# Patient Record
Sex: MALE | Race: WHITE
[De-identification: names, ages, dates, MRNs, and addresses within clinical notes are randomized per-mention and may not be internally consistent; named-entity substitution may affect disease eponyms.]

---

## 2018-04-27 ENCOUNTER — HOSPITAL ENCOUNTER (OUTPATIENT)
Dept: HOSPITAL 76 - EMS | Age: 58
Discharge: ANOTHER HOSPITAL | End: 2018-04-27
Attending: SURGERY
Payer: MEDICAID

## 2018-04-27 DIAGNOSIS — M54.9: ICD-10-CM

## 2018-04-27 DIAGNOSIS — R53.1: Primary | ICD-10-CM

## 2019-09-04 ENCOUNTER — HOSPITAL ENCOUNTER (OUTPATIENT)
Dept: HOSPITAL 76 - EMS | Age: 59
Discharge: ANOTHER HOSPITAL | End: 2019-09-04
Attending: SURGERY
Payer: MEDICAID

## 2019-09-04 DIAGNOSIS — R11.2: ICD-10-CM

## 2019-09-04 DIAGNOSIS — M54.5: Primary | ICD-10-CM

## 2020-11-19 ENCOUNTER — HOSPITAL ENCOUNTER (OUTPATIENT)
Dept: HOSPITAL 76 - CAM | Age: 60
Discharge: HOME | End: 2020-11-19
Attending: FAMILY MEDICINE
Payer: MEDICAID

## 2020-11-19 DIAGNOSIS — M47.22: Primary | ICD-10-CM

## 2020-11-19 DIAGNOSIS — M47.817: ICD-10-CM

## 2020-11-19 PROCEDURE — 97810 ACUP 1/> WO ESTIM 1ST 15 MIN: CPT

## 2020-11-19 PROCEDURE — 97811 ACUP 1/> W/O ESTIM EA ADD 15: CPT

## 2020-12-03 ENCOUNTER — HOSPITAL ENCOUNTER (OUTPATIENT)
Dept: HOSPITAL 76 - CAM | Age: 60
Discharge: HOME | End: 2020-12-03
Attending: FAMILY MEDICINE
Payer: MEDICAID

## 2020-12-03 DIAGNOSIS — M47.817: ICD-10-CM

## 2020-12-03 DIAGNOSIS — M47.812: Primary | ICD-10-CM

## 2020-12-03 DIAGNOSIS — M54.12: ICD-10-CM

## 2020-12-03 PROCEDURE — 97811 ACUP 1/> W/O ESTIM EA ADD 15: CPT

## 2020-12-03 PROCEDURE — 97810 ACUP 1/> WO ESTIM 1ST 15 MIN: CPT

## 2020-12-31 ENCOUNTER — HOSPITAL ENCOUNTER (OUTPATIENT)
Dept: HOSPITAL 76 - CAM | Age: 60
Discharge: HOME | End: 2020-12-31
Attending: FAMILY MEDICINE
Payer: MEDICAID

## 2020-12-31 DIAGNOSIS — M47.817: ICD-10-CM

## 2020-12-31 DIAGNOSIS — M54.12: ICD-10-CM

## 2020-12-31 DIAGNOSIS — M47.812: Primary | ICD-10-CM

## 2020-12-31 PROCEDURE — 97810 ACUP 1/> WO ESTIM 1ST 15 MIN: CPT

## 2020-12-31 PROCEDURE — 97811 ACUP 1/> W/O ESTIM EA ADD 15: CPT

## 2021-01-07 ENCOUNTER — HOSPITAL ENCOUNTER (OUTPATIENT)
Dept: HOSPITAL 76 - CAM | Age: 61
Discharge: HOME | End: 2021-01-07
Attending: FAMILY MEDICINE
Payer: MEDICAID

## 2021-01-07 DIAGNOSIS — M47.817: ICD-10-CM

## 2021-01-07 DIAGNOSIS — M54.12: ICD-10-CM

## 2021-01-07 DIAGNOSIS — M47.812: Primary | ICD-10-CM

## 2021-01-07 PROCEDURE — 97810 ACUP 1/> WO ESTIM 1ST 15 MIN: CPT

## 2021-01-07 PROCEDURE — 97811 ACUP 1/> W/O ESTIM EA ADD 15: CPT

## 2021-01-13 NOTE — EXTERNAL MEDICAL SUMMARY RPT
Continuity of Care Document

                           Created on:2021



Patient:KINGSTON THOMPSON

Sex:Male

:1960

External Reference #:[id number]





Demographics







                          Phone                     Unavailable

 

                          Preferred Language        English

 

                          Marital Status            Unknown

 

                          [employer] Affiliation     Unknown

 

                          Race                      Unknown

 

                          Ethnic Group              Unknown









Author







                          Organization              [facility]

 

                          Address                    [place] [address]

 

                          Phone                     [phone]









Support







                Name            Relationship    Address         Phone

 

                NOT             Unavailable     Unavailable     Unavailable









Care Team Providers







                    Name                Role                Phone

 

                    De Guzman            Unavailable         Unavailable

 

                    DE GUZMAN            Unavailable         Unavailable

 

                    Roche               Unavailable         Unavailable

 

                    De Guzman            Unavailable         Unavailable









Problems







                     date                description         facility

 

                                         Never smoked tobacco (finding)  [facility]

 

                                         Finding             [facility]

 

                     2020 15:21    Malignant neoplasm of left testis,  [facility]



                                        unspecified whether desce 

 

                     2020 15:21    Encounter for follow-up  [facility]

l



                                        examination after completed 



                                        treatmen            

 

                     2020 15:21    Personal history of malignant  [facility]

ospital



                                        neoplasm of testis  

 

                     2020-10-26 13:40    Encounter for follow-up  [facility]

l



                                        examination after completed 



                                        treatmen            

 

                     2020-10-26 13:40    Personal history of malignant  [facility]

ospital



                                        neoplasm of testis  

 

                     2020 14:30    OTHER SPONDYLOSIS WITH  [facility]



                                        RADICULOPATHY, CERVICAL REGION 

 

                     2020 14:30    SPONDYLOSIS W/O MYELOPATHY OR  [facility]



                                        RADICULOPATHY, CERVICAL REGION 

 

                     2020 14:30    SPONDYLS W/O MYELOPATHY OR  [facility]



                                        RADICULOPATHY, LUMBOSACR REGION 

 

                     2020 14:30    RADICULOPATHY, CERVICAL REGION  [facility]

 

                     2020 14:45    SPONDYLOSIS W/O MYELOPATHY OR  [facility]



                                        RADICULOPATHY, CERVICAL REGION 

 

                     2020 14:45    SPONDYLS W/O MYELOPATHY OR  [facility]



                                        RADICULOPATHY, LUMBOSACR REGION 

 

                     2020 14:45    RADICULOPATHY, CERVICAL REGION  [facility]

 

                     2020 16:30    SPONDYLOSIS W/O MYELOPATHY OR  [facility]



                                        RADICULOPATHY, CERVICAL REGION 

 

                     2020 16:30    SPONDYLS W/O MYELOPATHY OR  [facility]



                                        RADICULOPATHY, LUMBOSACR REGION 

 

                     2020 16:30    RADICULOPATHY, CERVICAL REGION  [facility]

 

                     2020 16:45    SPONDYLOSIS W/O MYELOPATHY OR  [facility]



                                        RADICULOPATHY, CERVICAL REGION 

 

                     2020 16:45    SPONDYLS W/O MYELOPATHY OR  [facility]



                                        RADICULOPATHY, LUMBOSACR REGION 

 

                     2020 16:45    RADICULOPATHY, CERVICAL REGION  [facility]

 

                     2020 15:30    SPONDYLOSIS W/O MYELOPATHY OR  [facility]



                                        RADICULOPATHY, CERVICAL REGION 

 

                     2020 15:30    SPONDYLS W/O MYELOPATHY OR  [facility]



                                        RADICULOPATHY, LUMBOSACR REGION 

 

                     2020 15:30    RADICULOPATHY, CERVICAL REGION  [facility]

 

                     2020 14:45    SPONDYLOSIS W/O MYELOPATHY OR  [facility]



                                        RADICULOPATHY, CERVICAL REGION 

 

                     2020 14:45    SPONDYLS W/O MYELOPATHY OR  [facility]



                                        RADICULOPATHY, LUMBOSACR REGION 

 

                     2020 14:45    RADICULOPATHY, CERVICAL REGION  [facility]

 

                     2020 10:12    Encounter for screening for other  [facility]



                                        viral diseases      

 

                     2020-12-10 09:52    Other intervertebral disc  [facility]

[facility]



                                        displacement, thoracic region 

 

                     2020 14:45    SPONDYLOSIS W/O MYELOPATHY OR  [facility]



                                        RADICULOPATHY, CERVICAL REGION 

 

                     2020 14:45    SPONDYLS W/O MYELOPATHY OR  [facility]



                                        RADICULOPATHY, LUMBOSACR REGION 

 

                     2020 14:45    RADICULOPATHY, CERVICAL REGION  [facility]

 

                     2020 13:37    Hepatic failure, unspecified  [facility]



                                        without coma        

 

                     2020 15:15    SPONDYLOSIS W/O MYELOPATHY OR  [facility]



                                        RADICULOPATHY, CERVICAL REGION 

 

                     2020 15:15    SPONDYLS W/O MYELOPATHY OR  [facility]



                                        RADICULOPATHY, LUMBOSACR REGION 

 

                     2020 15:15    RADICULOPATHY, CERVICAL REGION  [facility]

 

                     2020 11:38    Other intervertebral disc  [facility]

[facility]



                                        displacement, thoracic region 

 

                     2020 16:00    SPONDYLOSIS W/O MYELOPATHY OR  [facility]



                                        RADICULOPATHY, CERVICAL REGION 

 

                     2020 16:00    SPONDYLS W/O MYELOPATHY OR  [facility]



                                        RADICULOPATHY, LUMBOSACR REGION 

 

                     2020 16:00    RADICULOPATHY, CERVICAL REGION  [facility]

 

                     2020 11:00    SPONDYLOSIS W/O MYELOPATHY OR  [facility]



                                        RADICULOPATHY, CERVICAL REGION 

 

                     2020 11:02    SPONDYLOSIS W/O MYELOPATHY OR  [facility]



                                        RADICULOPATHY, CERVICAL REGION 

 

                     2020 11:02    SPONDYLS W/O MYELOPATHY OR  [facility]



                                        RADICULOPATHY, LUMBOSACR REGION 

 

                     2020 11:02    RADICULOPATHY, CERVICAL REGION  [facility]

 

                     2021 15:12    SPONDYLOSIS W/O MYELOPATHY OR  [facility]



                                        RADICULOPATHY, CERVICAL REGION 

 

                     2021 15:12    SPONDYLS W/O MYELOPATHY OR  [facility]



                                        RADICULOPATHY, LUMBOSACR REGION 

 

                     2021 15:12    RADICULOPATHY, CERVICAL REGION  [facility]

 

                     2021 16:45    SPONDYLOSIS W/O MYELOPATHY OR  [facility]



                                        RADICULOPATHY, CERVICAL REGION 

 

                     2021 16:45    SPONDYLS W/O MYELOPATHY OR  [facility]



                                        RADICULOPATHY, LUMBOSACR REGION 

 

                     2021 16:45    RADICULOPATHY, CERVICAL REGION  [facility]

 

                     2021 13:12    SPONDYLOSIS W/O MYELOPATHY OR  [facility]



                                        RADICULOPATHY, CERVICAL REGION 







Medications







                     date                description         facility

 

                     2020 00:00:00  Levothyroxine Sodium 0.05 MG Oral Tabl

et  [facility]

 

                     2020 00:00:00  Omeprazole 20 MG Enteric Coated Tablet

  [facility]

 

                     2020 00:00:00  0.8 ML adalimumab 50 MG/ML Prefilled S

[facility]







Procedures







                     date                description         facility

 

                     2020-10-26 00:00:00  [facility]









                     date                description         facility

 

                     2020-11-10 00:00:00  [facility]









                     date                description         facility

 

                     2020 00:00:00  [facility]









                     date                description         facility

 

                     2020 00:00:00                      [facility]









                     date                description         facility

 

                     2020 00:00:00  [facility]









                     date                description         facility

 

                     2020 00:00:00                      [facility]









                     date                description         facility

 

                     2020 00:00:00                      [facility]









                     date                description         facility

 

                     2020 00:00:00  [facility]









                     date                description         facility

 

                     2020 00:00:00  [facility]









                     date                description         facility

 

                     2020 00:00:00                      [facility]









                     date                description         facility

 

                     2020 00:00:00                      [facility]









                     date                description         facility

 

                     2020 00:00:00  [facility]









                     date                description         facility

 

                     2020 00:00:00  [facility]









                     date                description         facility

 

                     2020 00:00:00                      [facility]









                     date                description         facility

 

                     2020 00:00:00  [facility]









                     date                description         facility

 

                     2020-12-10 00:00:00  [facility]









                     date                description         facility

 

                     2020 00:00:00  [facility]









                     date                description         facility

 

                     2020 00:00:00  [facility]









                     date                description         facility

 

                     2020 00:00:00  [facility]









                     date                description         facility

 

                     2020 00:00:00  [facility]









                     date                description         facility

 

                     2020 00:00:00                      [facility]









                     date                description         facility

 

                     2020 00:00:00                      [facility]









                     date                description         facility

 

                     2020 00:00:00  [facility]









                     date                description         facility

 

                     2020 00:00:00                      [facility]







Results





Social History







                     date                description         facility

 

                     [id number]  Never smoked tobacco (finding)  [facility]







Social History







                     date                description         facility

 

                     [id number]  Never smoked tobacco ([facility])  [facility]









                     date                description         facility

 

                     [id number]

## 2021-01-14 ENCOUNTER — HOSPITAL ENCOUNTER (OUTPATIENT)
Dept: HOSPITAL 76 - CAM | Age: 61
Discharge: HOME | End: 2021-01-14
Attending: FAMILY MEDICINE
Payer: MEDICAID

## 2021-01-14 DIAGNOSIS — M54.12: ICD-10-CM

## 2021-01-14 DIAGNOSIS — M47.817: ICD-10-CM

## 2021-01-14 DIAGNOSIS — M47.812: Primary | ICD-10-CM

## 2021-01-14 PROCEDURE — 97814 ACUP 1/> W/ESTIM EA ADDL 15: CPT

## 2021-01-14 PROCEDURE — 97813 ACUP 1/> W/ESTIM 1ST 15 MIN: CPT

## 2021-01-28 ENCOUNTER — HOSPITAL ENCOUNTER (OUTPATIENT)
Dept: HOSPITAL 76 - CAM | Age: 61
Discharge: HOME | End: 2021-01-28
Attending: FAMILY MEDICINE
Payer: MEDICAID

## 2021-01-28 DIAGNOSIS — M47.812: Primary | ICD-10-CM

## 2021-01-28 DIAGNOSIS — M54.12: ICD-10-CM

## 2021-01-28 DIAGNOSIS — M47.817: ICD-10-CM

## 2021-01-28 PROCEDURE — 97814 ACUP 1/> W/ESTIM EA ADDL 15: CPT

## 2021-01-28 PROCEDURE — 97813 ACUP 1/> W/ESTIM 1ST 15 MIN: CPT

## 2021-02-11 ENCOUNTER — HOSPITAL ENCOUNTER (OUTPATIENT)
Dept: HOSPITAL 76 - CAM | Age: 61
Discharge: HOME | End: 2021-02-11
Attending: FAMILY MEDICINE
Payer: MEDICAID

## 2021-02-11 DIAGNOSIS — M54.12: ICD-10-CM

## 2021-02-11 DIAGNOSIS — M47.812: Primary | ICD-10-CM

## 2021-02-11 DIAGNOSIS — M47.817: ICD-10-CM

## 2021-02-11 PROCEDURE — 97813 ACUP 1/> W/ESTIM 1ST 15 MIN: CPT

## 2021-02-11 PROCEDURE — 97814 ACUP 1/> W/ESTIM EA ADDL 15: CPT

## 2021-02-25 ENCOUNTER — HOSPITAL ENCOUNTER (OUTPATIENT)
Dept: HOSPITAL 76 - CAM | Age: 61
Discharge: HOME | End: 2021-02-25
Attending: FAMILY MEDICINE
Payer: MEDICAID

## 2021-02-25 DIAGNOSIS — M54.12: ICD-10-CM

## 2021-02-25 DIAGNOSIS — M47.812: Primary | ICD-10-CM

## 2021-02-25 DIAGNOSIS — M47.817: ICD-10-CM

## 2021-02-25 PROCEDURE — 97813 ACUP 1/> W/ESTIM 1ST 15 MIN: CPT

## 2021-02-25 PROCEDURE — 97814 ACUP 1/> W/ESTIM EA ADDL 15: CPT

## 2021-03-11 ENCOUNTER — HOSPITAL ENCOUNTER (OUTPATIENT)
Dept: HOSPITAL 76 - CAM | Age: 61
Discharge: HOME | End: 2021-03-11
Attending: FAMILY MEDICINE
Payer: MEDICAID

## 2021-03-11 DIAGNOSIS — M47.817: ICD-10-CM

## 2021-03-11 DIAGNOSIS — M54.12: ICD-10-CM

## 2021-03-11 DIAGNOSIS — M47.812: Primary | ICD-10-CM

## 2021-03-11 PROCEDURE — 97811 ACUP 1/> W/O ESTIM EA ADD 15: CPT

## 2021-03-11 PROCEDURE — 97813 ACUP 1/> W/ESTIM 1ST 15 MIN: CPT

## 2021-04-01 ENCOUNTER — HOSPITAL ENCOUNTER (OUTPATIENT)
Dept: HOSPITAL 76 - CAM | Age: 61
Discharge: HOME | End: 2021-04-01
Attending: FAMILY MEDICINE
Payer: MEDICAID

## 2021-04-01 DIAGNOSIS — M54.12: ICD-10-CM

## 2021-04-01 DIAGNOSIS — M47.812: Primary | ICD-10-CM

## 2021-04-01 DIAGNOSIS — M47.817: ICD-10-CM

## 2021-04-01 PROCEDURE — 97811 ACUP 1/> W/O ESTIM EA ADD 15: CPT

## 2021-04-01 PROCEDURE — 97813 ACUP 1/> W/ESTIM 1ST 15 MIN: CPT

## 2021-04-07 NOTE — EXTERNAL MEDICAL SUMMARY RPT
Continuity of Care Document

                            Created on:2021



Patient:KNIGSTON THOMPSON

Sex:Male

:1960

External Reference #:[id number]





Demographics







                          Phone                     Unavailable

 

                          Preferred Language        English

 

                          Marital Status            Unknown

 

                          [employer] Affiliation     Unknown

 

                          Race                      Unknown

 

                          Ethnic Group              Unknown









Author







                          Organization              [facility]

 

                          Address                    [place] [id number]

 

                          Phone                     [phone]









Care Team Providers







                    Name                Role                Phone

 

                    Cheng            Unavailable         Unavailable

 

                    Cheng            Unavailable         Unavailable









Problems







                     date                description         facility

 

                     2021            Arthropathic psoriasis, unspecified  [facility]

 

                     2021            Dermatophytosis, unspecified  [facility]

[facility]

 

                     2021            Essential (primary) hypertension  [facility]

 

                     2021            Hypothyroidism, unspecified  [facility]

pital

 

                     2021            Thrombocytopenia, unspecified  [facility]

[facility]

 

                     2021            Tinea unguium       [facility]







Medications







                     date                description         facility

 

                     2021            Levothyroxine Sodium 0.05 MG Oral Table

t  [facility]

 

                     2021            Clotrimazole 10 MG Lozenge  [facility]

ital

 

                     2021            Prednisone 5 MG Oral Tablet  [facility]

pital







Procedures







                     date                description         facility

 

                     2021            [facility]









                     date                description         facility

 

                     2021            [facility]







Vital Signs







                 date            measurement     value           source

 

                 2021        BMI             35.0            kg/m2

 

                 2021        BP_diastolic    86              mm[Hg]

 

                 2021        BP_systolic     140             mm[Hg]

 

                 2021        heart_rate      59              /min

 

                 2021        height_metric   177.8           cm

 

                 2021        height_standard  70              in

 

                 2021        respiration_rate  16              /min

 

                 2021        weight_metric   50.23           kg

 

                 2021        weight_standard  110.73          lb







Social History







                     date                description         facility

 

                     [id number]